# Patient Record
Sex: MALE | HISPANIC OR LATINO | Employment: STUDENT | URBAN - METROPOLITAN AREA
[De-identification: names, ages, dates, MRNs, and addresses within clinical notes are randomized per-mention and may not be internally consistent; named-entity substitution may affect disease eponyms.]

---

## 2021-06-22 ENCOUNTER — OFFICE VISIT (OUTPATIENT)
Dept: INTERNAL MEDICINE | Facility: CLINIC | Age: 21
End: 2021-06-22

## 2021-06-22 VITALS
SYSTOLIC BLOOD PRESSURE: 124 MMHG | DIASTOLIC BLOOD PRESSURE: 78 MMHG | BODY MASS INDEX: 23.55 KG/M2 | HEART RATE: 58 BPM | HEIGHT: 69 IN | WEIGHT: 159 LBS | OXYGEN SATURATION: 100 %

## 2021-06-22 DIAGNOSIS — Z00.00 ANNUAL PHYSICAL EXAM: Primary | ICD-10-CM

## 2021-06-22 DIAGNOSIS — J30.1 SEASONAL ALLERGIC RHINITIS DUE TO POLLEN: ICD-10-CM

## 2021-06-22 DIAGNOSIS — Z13.220 SCREENING, LIPID: ICD-10-CM

## 2021-06-22 DIAGNOSIS — R04.0 EPISTAXIS: ICD-10-CM

## 2021-06-22 DIAGNOSIS — Z87.898 HISTORY OF DIARRHEA: ICD-10-CM

## 2021-06-22 DIAGNOSIS — Z11.3 ROUTINE SCREENING FOR STI (SEXUALLY TRANSMITTED INFECTION): ICD-10-CM

## 2021-06-22 PROBLEM — J45.909 CHILDHOOD ASTHMA: Status: ACTIVE | Noted: 2021-06-22

## 2021-06-22 PROCEDURE — 99385 PREV VISIT NEW AGE 18-39: CPT | Performed by: FAMILY MEDICINE

## 2021-06-22 NOTE — PROGRESS NOTES
Venipuncture performed in [facility] by KD with good hemostasis. Patient tolerated well. 6/22/2021 Lily FUENTES LPN.

## 2021-06-22 NOTE — PROGRESS NOTES
Date of Encounter: 2021  Patient: Judah Aragon,  2000    Subjective   History of Presenting Illness  Chief complaint: Annual physical    History of diarrhea, last for about 2 months while he was studying abroad in [place], associated with generalized upper gastric discomfort, fatigue, nausea.  Tried a FODMAP diet which helped briefly but then it recurred.  No blood in stool.  No history of fever or chills.  No close contacts with similar illness.  He was eating raw meat in [place].  Fortunately symptoms have completely resolved for the past 3 weeks.    Intermittent epistaxis, scratches the inside of his nose due to allergies.  No abnormal bruising or bleeding.    Not taking anything for allergies.    Lives with mother, stepfather.  Sexually active with 1 female partner within the past year, amenable to STI testing.  Smokes 1 pack/week, has not smoked for 2 weeks.  Not interested in medications for quitting.  3 alcoholic beverages per week.  Exercises twice per week week by running and lifting weights, also plays soccer and basketball.  Average diet.  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  Does not have a living will.  Pending second Covid vaccination.  Has had HPV vaccination.    Review of Systems:  Negative for fever, congestion, chest pain upon exertion, shortness of breath, vision changes, vomiting, dysuria, lymphadenopathy, muscle weakness, numbness, mood changes, rashes.    The following portions of the patient's history were reviewed and updated as appropriate: allergies, current medications, past family history, past medical history, past social history, past surgical history and problem list.    Patient Active Problem List   Diagnosis   • Epistaxis   • Seasonal allergic rhinitis due to pollen   • History of diarrhea     History reviewed. No pertinent past medical history.  Past Surgical History:   Procedure Laterality Date   • WISDOM TOOTH EXTRACTION       Family History  "  Problem Relation Age of Onset   • Hypertension Mother    • Hypertension Father    • Alzheimer's disease Paternal Grandmother    • Heart disease Paternal Grandfather      No current outpatient medications on file.  No Known Allergies  Social History     Tobacco Use   • Smoking status: Current Every Day Smoker   • Smokeless tobacco: Never Used   Substance Use Topics   • Alcohol use: Yes   • Drug use: Not on file          Objective   Physical Exam  Vitals:    06/22/21 1454   BP: 124/78   Pulse: 58   SpO2: 100%   Weight: 72.1 kg (159 lb)   Height: 175.3 cm (69\")     Body mass index is 23.48 kg/m².    Constitutional: NAD.  Eyes: EOMI. PERRLA. Normal conjunctiva.  Ear, nose, mouth, throat: No tonsillar exudates or erythema.  Excoriation of the anterior right nasal septum with oozing bleeding. Normal external ear canals and TMs bilaterally.  Cardiovascular: RRR. No murmurs. No LE edema b/l. Radial pulses 2+ bilaterally.  Pulmonary: CTA b/l. Good effort.  Integumentary: No rashes or wounds on face or upper extremities.  Lymphatic: No anterior cervical lymphadenopathy.  Endocrine: No thyromegaly or palpable thyroid nodules.  Psychiatric: Normal affect. Normal thought content.  Gastrointestinal: Nondistended. No hepatosplenomegaly.  Mild discomfort with very deep palpation of the right lower quadrant but no rebound tenderness. Normal bowel sounds.       Assessment/Plan   Assessment and Plan  Pleasant 21-year-old male with history of childhood asthma, seasonal allergic rhinitis, recurrent epistaxis, who presents with the following:    Diagnoses and all orders for this visit:    1. Annual physical exam (Primary): We discussed preventative care including age and patient-appropriate immunizations and cancer screening. We also discussed the importance of exercise and a healthy diet. This is their annual preventative exam.    2. Epistaxis: No evidence of telangiectasia or capillary malformation, most likely recurrent traumatic, " recommend he avoid scratching the inside of his nose and take over-the-counter loratadine for allergy symptoms instead  -     CBC & Differential  3. Seasonal allergic rhinitis due to pollen    4. History of diarrhea: Resolved, potentially E. coli due to raw meat consumption, if recurs need to consider stool testing    5. Screening, lipid  -     Lipid Panel    6. Routine screening for STI (sexually transmitted infection)  -     Chlamydia trachomatis, Neisseria gonorrhoeae, Trichomonas vaginalis, PCR - Urine, Urine, Clean Catch  -     HIV-1 / O / 2 Ag / Antibody 4th Generation  -     RPR  -     HSV 1 & 2 - Specific Antibody, IgG  -     Hepatitis Panel, Acute    Return to office in 1 year for annual physical or earlier as needed.    Dale Cooper MD  Family Medicine  O: [phone]  C: [phone]    Disclaimer: Parts of this note were dictated by speech recognition. Minor errors in transcription may be present. Please call if questions.

## 2021-06-23 LAB
BASOPHILS # BLD AUTO: 0.02 10*3/MM3 (ref 0–0.2)
BASOPHILS NFR BLD AUTO: 0.4 % (ref 0–1.5)
CHOLEST SERPL-MCNC: 175 MG/DL (ref 0–200)
EOSINOPHIL # BLD AUTO: 0.06 10*3/MM3 (ref 0–0.4)
EOSINOPHIL NFR BLD AUTO: 1.1 % (ref 0.3–6.2)
ERYTHROCYTE [DISTWIDTH] IN BLOOD BY AUTOMATED COUNT: 11.9 % (ref 12.3–15.4)
HAV IGM SERPL QL IA: NEGATIVE
HBV CORE IGM SERPL QL IA: NEGATIVE
HBV SURFACE AG SERPL QL IA: NEGATIVE
HCT VFR BLD AUTO: 44.4 % (ref 37.5–51)
HCV AB S/CO SERPL IA: <0.1 S/CO RATIO (ref 0–0.9)
HDLC SERPL-MCNC: 58 MG/DL (ref 40–60)
HGB BLD-MCNC: 15.2 G/DL (ref 13–17.7)
HIV 1+2 AB+HIV1 P24 AG SERPL QL IA: NON REACTIVE
HSV1 IGG SER IA-ACNC: <0.91 INDEX (ref 0–0.9)
HSV2 IGG SER IA-ACNC: <0.91 INDEX (ref 0–0.9)
IMM GRANULOCYTES # BLD AUTO: 0.02 10*3/MM3 (ref 0–0.05)
IMM GRANULOCYTES NFR BLD AUTO: 0.4 % (ref 0–0.5)
LDLC SERPL CALC-MCNC: 92 MG/DL (ref 0–100)
LYMPHOCYTES # BLD AUTO: 1.74 10*3/MM3 (ref 0.7–3.1)
LYMPHOCYTES NFR BLD AUTO: 30.6 % (ref 19.6–45.3)
MCH RBC QN AUTO: 30.9 PG (ref 26.6–33)
MCHC RBC AUTO-ENTMCNC: 34.2 G/DL (ref 31.5–35.7)
MCV RBC AUTO: 90.2 FL (ref 79–97)
MONOCYTES # BLD AUTO: 0.45 10*3/MM3 (ref 0.1–0.9)
MONOCYTES NFR BLD AUTO: 7.9 % (ref 5–12)
NEUTROPHILS # BLD AUTO: 3.4 10*3/MM3 (ref 1.7–7)
NEUTROPHILS NFR BLD AUTO: 59.6 % (ref 42.7–76)
NRBC BLD AUTO-RTO: 0 /100 WBC (ref 0–0.2)
PLATELET # BLD AUTO: 202 10*3/MM3 (ref 140–450)
RBC # BLD AUTO: 4.92 10*6/MM3 (ref 4.14–5.8)
RPR SER QL: NORMAL
TRIGL SERPL-MCNC: 142 MG/DL (ref 0–150)
VLDLC SERPL CALC-MCNC: 25 MG/DL (ref 5–40)
WBC # BLD AUTO: 5.69 10*3/MM3 (ref 3.4–10.8)

## 2021-06-25 LAB
C TRACH RRNA SPEC QL NAA+PROBE: NEGATIVE
N GONORRHOEA RRNA SPEC QL NAA+PROBE: NEGATIVE
T VAGINALIS DNA SPEC QL NAA+PROBE: NEGATIVE